# Patient Record
Sex: FEMALE | Race: WHITE | Employment: UNEMPLOYED | URBAN - METROPOLITAN AREA
[De-identification: names, ages, dates, MRNs, and addresses within clinical notes are randomized per-mention and may not be internally consistent; named-entity substitution may affect disease eponyms.]

---

## 2017-08-08 RX ORDER — HYDROCODONE BITARTRATE AND ACETAMINOPHEN 7.5; 325 MG/1; MG/1
1 TABLET ORAL
Qty: 90 TAB | Refills: 0 | Status: SHIPPED | OUTPATIENT
Start: 2017-08-08 | End: 2017-09-05 | Stop reason: SDUPTHER

## 2017-08-08 NOTE — TELEPHONE ENCOUNTER
Requested Prescriptions     Pending Prescriptions Disp Refills    HYDROcodone-acetaminophen (NORCO) 7.5-325 mg per tablet 90 Tab 0     Sig: Take 1 Tab by mouth three (3) times daily as needed for Pain. Max Daily Amount: 3 Tabs.        Last Refill: 07/11/2017  Next Appointment: none Last seen: 06/20/2017

## 2017-09-05 ENCOUNTER — OFFICE VISIT (OUTPATIENT)
Dept: INTERNAL MEDICINE CLINIC | Age: 47
End: 2017-09-05

## 2017-09-05 VITALS
BODY MASS INDEX: 33.49 KG/M2 | WEIGHT: 201 LBS | HEIGHT: 65 IN | SYSTOLIC BLOOD PRESSURE: 130 MMHG | DIASTOLIC BLOOD PRESSURE: 91 MMHG

## 2017-09-05 DIAGNOSIS — G89.29 CHRONIC MIDLINE LOW BACK PAIN WITHOUT SCIATICA: Primary | ICD-10-CM

## 2017-09-05 DIAGNOSIS — M54.50 CHRONIC MIDLINE LOW BACK PAIN WITHOUT SCIATICA: Primary | ICD-10-CM

## 2017-09-05 RX ORDER — HYDROCODONE BITARTRATE AND ACETAMINOPHEN 7.5; 325 MG/1; MG/1
1 TABLET ORAL
Qty: 90 TAB | Refills: 0 | Status: SHIPPED | OUTPATIENT
Start: 2017-09-05

## 2017-09-05 NOTE — MR AVS SNAPSHOT
Visit Information Date & Time Provider Department Dept. Phone Encounter #  
 9/5/2017 10:20 AM HAYDE Reynaga MD [address] [place] [address] [phone] [id number] Follow-up Instructions Return in about 3 months (around 12/5/2017) for follow up. Upcoming Health Maintenance Date Due DTaP/Tdap/Td series (1 - Tdap) 11/7/1992 PAP AKA CERVICAL CYTOLOGY 11/7/1992 INFLUENZA AGE 9 TO ADULT 8/1/2017 Allergies as of 9/5/2017  Review Complete On: 9/5/2017 By: Khoi Johnston MD  
  
 Severity Noted Reaction Type Reactions Erythromycin  01/03/2013    Nausea Only Haldol [Haloperidol Lactate]  01/03/2013    Other (comments)  
 hallucinations Zinc  09/05/2017    Nausea Only Current Immunizations  Never Reviewed No immunizations on file. Not reviewed this visit You Were Diagnosed With   
  
 Codes Comments Chronic midline low back pain without sciatica    -  Primary ICD-10-CM: M54.5, G89.29 ICD-9-CM: 724.2, 338.29 Vitals BP Height(growth percentile) Weight(growth percentile) BMI OB Status Smoking Status (!) 130/91 (BP 1 Location: Right arm, BP Patient Position: Sitting) 5' 5\" (1.651 m) 201 lb (91.2 kg) 33.45 kg/m2 Having regular periods Former Smoker Vitals History BMI and BSA Data Body Mass Index Body Surface Area  
 33.45 kg/m 2 2.05 m 2 Preferred Pharmacy Pharmacy Name Phone [id number] [address] [phone] Your Updated Medication List  
  
   
This list is accurate as of: 9/5/17 11:16 AM.  Always use your most recent med list.  
  
  
  
  
 HYDROcodone-acetaminophen 7.5-325 mg per tablet Commonly known as:  Marjorie Leyland Take 1 Tab by mouth three (3) times daily as needed for Pain. Max Daily Amount: 3 Tabs. KLONOPIN PO Take 1 mg by mouth daily. LaMICtal 200 mg tablet Generic drug:  lamoTRIgine Take 200 mg by mouth daily. WELLBUTRIN  mg XL tablet Generic drug:  buPROPion XL Take 300 mg by mouth every morning. ziprasidone 80 mg capsule Commonly known as:  Belinda William Take 160 mg by mouth daily. Prescriptions Printed Refills HYDROcodone-acetaminophen (NORCO) 7.5-325 mg per tablet 0 Sig: Take 1 Tab by mouth three (3) times daily as needed for Pain. Max Daily Amount: 3 Tabs. Class: Print Route: Oral  
  
We Performed the Following Sharyle Pedlar 13 (MW) [[id number] Custom] Follow-up Instructions Return in about 3 months (around 12/5/2017) for follow up. Introducing [facility]! [facility] introduces [employer] patient portal. Now you can access parts of your medical record, email your doctor's office, and request medication refills online. 1. In your internet browser, go to [email or web address] [email or web address]. [email or web address]/Robert Applebaum MD 2. Click on the First Time User? Click Here link in the Sign In box. You will see the New Member Sign Up page. 3. Enter your [employer] Access Code exactly as it appears below. You will not need to use this code after youve completed the sign-up process. If you do not sign up before the expiration date, you must request a new code. · [employer] Access Code: [email or web address] Expires: 12/4/2017  9:43 AM 
 
4. Enter the last four digits of your Social Security Number (xxxx) and Date of Birth (mm/dd/yyyy) as indicated and click Submit. You will be taken to the next sign-up page. 5. Create a [employer] ID. This will be your [employer] login ID and cannot be changed, so think of one that is secure and easy to remember. 6. Create a [employer] password. You can change your password at any time. 7. Enter your Password Reset Question and Answer. This can be used at a later time if you forget your password. 8. Enter your e-mail address. You will receive e-mail notification when new information is available in [address] 9. Click Sign Up. You can now view and download portions of your medical record. 10. Click the Download Summary menu link to download a portable copy of your medical information. If you have questions, please visit the Frequently Asked Questions section of the [employer] website. Remember, [employer] is NOT to be used for urgent needs. For medical emergencies, dial 911. Now available from your iPhone and Android! Please provide this summary of care documentation to your next provider. Your primary care clinician is listed as HAYDE Win. If you have any questions after today's visit, please call [phone].

## 2017-09-05 NOTE — PROGRESS NOTES
Gisela Olmos is a 40 y.o. female presenting for Follow-up (Rm 1 - 3 month)  . 1. Have you been to the ER, urgent care clinic since your last visit? Hospitalized since your last visit? No    2. Have you seen or consulted any other health care providers outside of the [address] [place] [address] since your last visit? Include any pap smears or colon screening.  No

## 2017-09-05 NOTE — PROGRESS NOTES
This note will not be viewable in [address] Va Pineda is a 40 y.o. female and presents with Medication Evaluation (Rm 1 - 3 month)  . Subjective:  Mrs. Belinda Porter presents today for follow-up of chronic back pain. She takes hydrocodone on a chronic basis. The pain is not more severe but is persistent. She denies any complications or problems with her medication. She specifically denies any constipation. Past Medical History:   Diagnosis Date    Mood disorder ([address]     hx bipolar disorder    Psychiatric disorder     Suicidal thoughts     pt reports writing a suicide note     Past Surgical History:   Procedure Laterality Date    BREAST SURGERY PROCEDURE UNLISTED       breast augmentation    HX GYN       x2    HX ORTHOPAEDIC      knee surgery     Allergies   Allergen Reactions    Erythromycin Nausea Only    Haldol [Haloperidol Lactate] Other (comments)     hallucinations    Zinc Nausea Only     Current Outpatient Prescriptions   Medication Sig Dispense Refill    HYDROcodone-acetaminophen (NORCO) 7.5-325 mg per tablet Take 1 Tab by mouth three (3) times daily as needed for Pain. Max Daily Amount: 3 Tabs. 90 Tab 0    CLONAZEPAM (KLONOPIN PO) Take 1 mg by mouth daily.  ziprasidone (GEODON) 80 mg capsule Take 160 mg by mouth daily.  lamoTRIgine (LAMICTAL) 200 mg tablet Take 200 mg by mouth daily.  buPROPion XL (WELLBUTRIN XL) 300 mg XL tablet Take 300 mg by mouth every morning.          Social History     Social History    Marital status:      Spouse name: N/A    Number of children: N/A    Years of education: N/A     Social History Main Topics    Smoking status: Former Smoker    Smokeless tobacco: Never Used    Alcohol use No    Drug use: Yes     Special: Methamphetamines    Sexual activity: Not Asked     Other Topics Concern    None     Social History Narrative     Family History   Problem Relation Age of Onset    Diabetes Father        Review of Systems  Constitutional:  negative for fevers, chills, anorexia and weight loss  Eyes:    negative for visual disturbance and irritation  ENT:    negative for tinnitus,sore throat,nasal congestion,ear pains. hoarseness  Respiratory:     negative for cough, hemoptysis, dyspnea,wheezing  CV:    negative for chest pain, palpitations, lower extremity edema  GI:    negative for nausea, vomiting, diarrhea, abdominal pain,melena  Endo:               negative for polyuria,polydipsia,polyphagia,heat intolerance  Genitourinary : negative for frequency, dysuria and hematuria  Integumentary: negative for rash and pruritus  Hematologic:   negative for easy bruising and gum/nose bleeding  Musculoskel:  negative for myalgias, arthralgias, back pain, muscle weakness, joint pain  Neurological:   negative for headaches, dizziness, vertigo, memory problems and gait   Behavl/Psych:  negative for feelings of anxiety, depression, mood changes  ROS otherwise negative      Objective:  Visit Vitals    BP (!) 130/91 (BP 1 Location: Right arm, BP Patient Position: Sitting)    Ht 5' 5\" (1.651 m)    Wt 201 lb (91.2 kg)    BMI 33.45 kg/m2     Physical Exam:   General appearance - alert, well appearing, and in no distress  Mental status - alert, oriented to person, place, and time  EYE-FRANKO, EOMI, fundi normal, corneas normal, no foreign bodies  ENT-ENT exam normal, no neck nodes or sinus tenderness  Nose - normal and patent, no erythema, discharge or polyps  Mouth - mucous membranes moist, pharynx normal without lesions  Neck - supple, no significant adenopathy   Chest - clear to auscultation, no wheezes, rales or rhonchi, symmetric air entry   Heart - normal rate, regular rhythm, normal S1, S2, no murmurs, rubs, clicks or gallops   Abdomen - soft, nontender, nondistended, no masses or organomegaly  Lymph- no adenopathy palpable  Ext-peripheral pulses normal, no pedal edema, no clubbing or cyanosis  Skin-Warm and dry.  no hyperpigmentation, vitiligo, or suspicious lesions  Neuro -alert, oriented, normal speech, no focal findings or movement disorder noted      Assessment/Plan:  Diagnoses and all orders for this visit:    1. Chronic midline low back pain without sciatica  -     TOXASSURE SELECT 13 (MW)    Other orders  -     HYDROcodone-acetaminophen (NORCO) 7.5-325 mg per tablet; Take 1 Tab by mouth three (3) times daily as needed for Pain. Max Daily Amount: 3 Tabs. ICD-10-CM ICD-9-CM    1. Chronic midline low back pain without sciatica M54.5 724.2 TOXASSURE SELECT 13 (MW)    G89.29 338.29      Plan:    Patient's [place]  profile reviewed and urine drug screen ordered. Refill on medication as prescribed. Follow-up Disposition:  Return in about 3 months (around 12/5/2017) for follow up. I have reviewed with the patient details of the assessment and plan and all questions were answered. Relevent patient education was performed. Verbal and/or written instructions (see AVS) provided. The most recent lab findings were reviewed with the patient. An After Visit Summary was printed and given to the patient.     Elena Escobar MD

## 2017-09-11 ENCOUNTER — TELEPHONE (OUTPATIENT)
Dept: INTERNAL MEDICINE CLINIC | Age: 47
End: 2017-09-11

## 2017-09-12 LAB — DRUGS UR: NORMAL

## 2024-01-24 ENCOUNTER — TELEPHONE (OUTPATIENT)
Facility: CLINIC | Age: 54
End: 2024-01-24

## 2024-01-24 NOTE — TELEPHONE ENCOUNTER
Dr Yg Newby's office called and requested records on patient. They need the last office note,EKG and labs. Please fax to [phone]. She has an appt. In Feb.